# Patient Record
Sex: MALE | Race: WHITE | NOT HISPANIC OR LATINO | Employment: OTHER | URBAN - METROPOLITAN AREA
[De-identification: names, ages, dates, MRNs, and addresses within clinical notes are randomized per-mention and may not be internally consistent; named-entity substitution may affect disease eponyms.]

---

## 2017-01-30 ENCOUNTER — CLINICAL SUPPORT (OUTPATIENT)
Dept: SMOKING CESSATION | Facility: CLINIC | Age: 72
End: 2017-01-30
Payer: COMMERCIAL

## 2017-01-30 VITALS
TEMPERATURE: 98 F | WEIGHT: 134.69 LBS | HEIGHT: 64 IN | DIASTOLIC BLOOD PRESSURE: 63 MMHG | OXYGEN SATURATION: 89 % | BODY MASS INDEX: 22.99 KG/M2 | SYSTOLIC BLOOD PRESSURE: 128 MMHG | HEART RATE: 100 BPM

## 2017-01-30 DIAGNOSIS — F17.200 NICOTINE DEPENDENCE: Primary | ICD-10-CM

## 2017-01-30 PROCEDURE — 99404 PREV MED CNSL INDIV APPRX 60: CPT | Mod: S$GLB,,,

## 2017-01-30 PROCEDURE — 99999 PR PBB SHADOW E&M-EST. PATIENT-LVL III: CPT | Mod: PBBFAC,,,

## 2017-01-30 RX ORDER — METFORMIN HYDROCHLORIDE 500 MG/1
1000 TABLET ORAL 2 TIMES DAILY WITH MEALS
COMMUNITY

## 2017-01-30 NOTE — Clinical Note
Patient was seen in clinic today for Tobacco Cessation. Patient states that he smokes with stress and smoked 1 pack per day for 50 years. Patient state she is ready to quit for health reasons, he mentions his diagnosis of COPD and he uses oxygen as prescribed by his Pulmonologist, Dr. Neil Figueredo. Patient states that he has tried to quit in the past and he would like to try Chantix at this time. I have scheduled a follow up for 2/14/2017. Ángela Berger PA-C will review medical hx, family hx and medications. I have sent a signed request for medical record release, to Dr. Neil Figueredo.

## 2017-01-30 NOTE — Clinical Note
Please review chart for completion, and acknowledgement that you have reviewed medical history and medication records. You are in agreement with the tobacco cessation treatment plan as documented. I have pended the order for Chantix awaiting your review. Thank you.

## 2017-01-30 NOTE — MR AVS SNAPSHOT
[facility]  [address]  [place] [address]  Phone: [phone]                  Slick Holt   2017 6:00 PM   Clinical Support    Description:  Male : 1945   Provider:  Matthew Siegel   Department:  [facility]           Reason for Visit     Nicotine Dependence           Diagnoses this Visit        Comments    Nicotine dependence    -  Primary            To Do List           Future Appointments        Provider Department Dept Phone    2017 1:00 PM Matthew Siegel [facility] [phone]      Goals (5 Years of Data)     None      Follow-Up and Disposition     Discussed this visit Return in about 2 weeks (around 2017) for Tobacco Cessation .      [facility]     [facility] On Call Nurse Care Line -  Assistance  Registered nurses in the [facility] provide clinical advisement, health education, appointment booking, and other advisory services.  Call for this free service at [phone].             Medications           Message regarding Medications     Verify the changes and/or additions to your medication regime listed below are the same as discussed with your clinician today.  If any of these changes or additions are incorrect, please notify your healthcare provider.             Verify that the below list of medications is an accurate representation of the medications you are currently taking.  If none reported, the list may be blank. If incorrect, please contact your healthcare provider. Carry this list with you in case of emergency.           Current Medications     metformin (GLUCOPHAGE) 500 MG tablet Take 500 mg by mouth 2 (two) times daily with meals.    albuterol-ipratropium 2.5mg-0.5mg/3mL (DUO-NEB) 0.5 mg-3 mg(2.5 mg base)/3 mL nebulizer solution Take 3 mLs by nebulization 4 (four) times daily.    ammonium lactate (LAC-HYDRIN) 12 % lotion Apply topically 2 (two) times daily as needed for Dry Skin.     "aspirin (ECOTRIN) 81 MG EC tablet Take 81 mg by mouth once daily.    ezetimibe (ZETIA) 10 mg tablet Take 10 mg by mouth once daily.    fluorouracil (EFUDEX) 5 % cream Apply topically 2 (two) times daily.    INSULIN ASPART SUBQ Inject 15 Units into the skin 3 (three) times daily with meals.    insulin glargine (LANTUS) 100 unit/mL injection Inject 30 Units into the skin every evening.    ipratropium-albuterol (COMBIVENT)  mcg/actuation inhaler Inhale 1 puff into the lungs 4 (four) times daily as needed for Wheezing.    losartan (COZAAR) 25 MG tablet Take 25 mg by mouth once daily.    metoprolol tartrate (LOPRESSOR) 25 MG tablet Take 25 mg by mouth once daily.    nitroGLYCERIN (NITROSTAT) 0.4 MG SL tablet Place 0.4 mg under the tongue every 5 (five) minutes as needed for Chest pain.    rosuvastatin (CRESTOR) 40 MG Tab Take 20 mg by mouth every evening.    tiotropium (SPIRIVA) 18 mcg inhalation capsule Inhale 18 mcg into the lungs once daily.           Clinical Reference Information           Vital Signs - Last Recorded  Most recent update: 1/30/2017  6:11 PM by Matthew Siegel    BP Pulse Temp Ht Wt SpO2    128/63 (BP Location: Right arm, Patient Position: Sitting, BP Method: Automatic) 100 97.7 °F (36.5 °C) 5' 4" (1.626 m) 61.1 kg (134 lb 11.2 oz) (!) 89%    BMI                23.12 kg/m2          Blood Pressure          Most Recent Value    BP  128/63      Allergies as of 1/30/2017     Codeine      Immunizations Administered on Date of Encounter - 1/30/2017     None      [facility] Sign-Up     Activating your [facility] account is as easy as 1-2-3!     1) Visit [email or web address], select Sign Up Now, enter this activation code and your date of birth, then select Next.  [email or web address]  Expires: 3/16/2017  6:00 PM      2) Create a username and password to use when you visit [facility] in the future and select a security question in case you lose your password and select Next.    3) Enter your e-mail address and click " Sign Up!    Additional Information  If you have questions, please e-mail [email or web address] or call [phone] to talk to our [facility] staff. Remember, [facility] is NOT to be used for urgent needs. For medical emergencies, dial 911.         Smoking Cessation     If you would like to quit smoking:   You may be eligible for free services if you are a [place] resident and started smoking cigarettes before September 1, 1988.  Call the Smoking Cessation Trust (SCT) toll free at (018) 651-6290 or (010) 007-8888.   Call 2-338-QUIT-NOW if you do not meet the above criteria.

## 2017-01-31 RX ORDER — VARENICLINE TARTRATE 0.5 MG/1
TABLET, FILM COATED ORAL
Qty: 60 TABLET | Refills: 0 | Status: SHIPPED | OUTPATIENT
Start: 2017-01-31 | End: 2017-02-28

## 2017-02-01 NOTE — PROGRESS NOTES
Subjective:       Patient ID: Slick Holt is a 71 y.o. male.    Chief Complaint: Nicotine Dependence    HPI  Review of Systems    Objective:      Physical Exam    Assessment:       1. Nicotine dependence        Plan:       Slick was seen today for nicotine dependence.    Diagnoses and all orders for this visit:    Nicotine dependence  -     varenicline (CHANTIX) 0.5 MG Tab; Take one tablet per day 0.5 mg for 7 days, then take 0.5 mg twice per day.    Patient's medications and medical history reviewed via medical chart. . I agree treatment plan. Patient should contact clinic with any question or if any unwanted side effects develop.

## 2017-02-21 ENCOUNTER — CLINICAL SUPPORT (OUTPATIENT)
Dept: SMOKING CESSATION | Facility: CLINIC | Age: 72
End: 2017-02-21
Payer: COMMERCIAL

## 2017-02-21 DIAGNOSIS — F17.200 NICOTINE DEPENDENCE: Primary | ICD-10-CM

## 2017-02-21 PROCEDURE — 99407 BEHAV CHNG SMOKING > 10 MIN: CPT | Mod: S$GLB,,,

## 2017-02-26 ENCOUNTER — HOSPITAL ENCOUNTER (EMERGENCY)
Facility: HOSPITAL | Age: 72
Discharge: HOME | End: 2017-02-26
Attending: EMERGENCY MEDICINE
Payer: COMMERCIAL

## 2017-02-26 VITALS
DIASTOLIC BLOOD PRESSURE: 71 MMHG | SYSTOLIC BLOOD PRESSURE: 126 MMHG | BODY MASS INDEX: 22.2 KG/M2 | HEART RATE: 74 BPM | HEIGHT: 64 IN | TEMPERATURE: 98 F | OXYGEN SATURATION: 96 % | RESPIRATION RATE: 18 BRPM | WEIGHT: 130 LBS

## 2017-02-26 DIAGNOSIS — M79.89 LEG SWELLING: ICD-10-CM

## 2017-02-26 LAB
ALBUMIN SERPL BCP-MCNC: 3.8 G/DL
ALP SERPL-CCNC: 110 U/L
ALT SERPL W/O P-5'-P-CCNC: 14 U/L
ANION GAP SERPL CALC-SCNC: 10 MMOL/L
AST SERPL-CCNC: 13 U/L
BILIRUB SERPL-MCNC: 0.8 MG/DL
BUN SERPL-MCNC: 14 MG/DL
CALCIUM SERPL-MCNC: 9.8 MG/DL
CHLORIDE SERPL-SCNC: 99 MMOL/L
CO2 SERPL-SCNC: 27 MMOL/L
CREAT SERPL-MCNC: 0.9 MG/DL
EST. GFR  (AFRICAN AMERICAN): >60 ML/MIN/1.73 M^2
EST. GFR  (NON AFRICAN AMERICAN): >60 ML/MIN/1.73 M^2
GLUCOSE SERPL-MCNC: 315 MG/DL
POTASSIUM SERPL-SCNC: 4.1 MMOL/L
PROT SERPL-MCNC: 6.9 G/DL
SODIUM SERPL-SCNC: 136 MMOL/L

## 2017-02-26 PROCEDURE — 99284 EMERGENCY DEPT VISIT MOD MDM: CPT

## 2017-02-26 PROCEDURE — 80053 COMPREHEN METABOLIC PANEL: CPT

## 2017-02-26 PROCEDURE — 36415 COLL VENOUS BLD VENIPUNCTURE: CPT

## 2017-02-26 RX ORDER — CARVEDILOL 6.25 MG/1
6.25 TABLET ORAL 2 TIMES DAILY WITH MEALS
COMMUNITY

## 2017-02-26 RX ORDER — GUAIFENESIN 600 MG/1
1200 TABLET, EXTENDED RELEASE ORAL 2 TIMES DAILY
COMMUNITY

## 2017-02-26 RX ORDER — ERGOCALCIFEROL 1.25 MG/1
50000 CAPSULE ORAL
COMMUNITY

## 2017-02-26 RX ORDER — AMIODARONE HYDROCHLORIDE 100 MG/1
TABLET ORAL DAILY
COMMUNITY

## 2017-02-26 RX ORDER — FERROUS SULFATE, DRIED 160(50) MG
1 TABLET, EXTENDED RELEASE ORAL 2 TIMES DAILY WITH MEALS
COMMUNITY

## 2017-02-26 RX ORDER — LORATADINE 10 MG/1
10 TABLET ORAL DAILY
COMMUNITY

## 2017-02-26 NOTE — DISCHARGE INSTRUCTIONS
Leg Swelling in Both Legs    Swelling of the feet, ankles, and legs is called edema. It is caused by excess fluid that has collected in the tissues. Extra fluid in the body settles in the lowest part because of gravity. This is why the legs and feet are most affected.  Some of the causes for edema include:  · Disease of the heart like congestive heart failure  · Standing or sitting for long periods of time  · Infection of the feet or legs  · Blood pooling in the veins of your legs (venous insufficiency)  · Dilated veins in your lower leg (varicose veins)  · Garters or other clothing that is tight on your legs. This will cause blood to pool in your legs because the clothing limits blood flow.  · Some medicines such as hormones like birth control pills, some blood pressure medicines like calcium channel blockers (amlodipine) and steroids, some antidepressants like MAO inhibitors and tricyclics  · Menstrual periods that cause you to retain fluids  · Many types of renal disease  · Liver failure or cirrhosis  · Pregnancy, some swelling is normal, but a sudden increase in leg swelling or weight gain can be a sign of a dangerous complication of pregnancy  · Poor nutrition  · Thyroid disease  Medical treatment will depend on what is causing the swelling in your legs. Your healthcare provider may prescribe water pills (diuretics) to get rid of the extra fluid.  Home care  Follow these guidelines when caring for yourself at home:  · Don't wear clothing like garters that is tight on your legs.  · Keep your legs up while lying or sitting.  · If infection, injury, or recent surgery is causing the swelling, stay off your legs as much as possible until symptoms get better.  · If your healthcare provider says that your leg swelling is caused by venous insufficiency or varicose veins, don't sit or  one place for long periods of time. Take breaks and walk about every few hours. Brisk walking is a good exercise. It helps  circulate the blood that has collected in your leg. Talk with your provider about using support stockings to stop daytime leg swelling.  · If your provider says that heart disease is causing your leg swelling, follow a low-salt diet to stop extra fluid from staying in your body. You may also need medicine.  Follow-up care  Follow up with your healthcare provider, or as advised.  When to seek medical advice  Call your healthcare provider right away if any of these occur:  · New shortness of breath or chest pain  · Shortness of breath or chest pain that gets worse  · Swelling in both legs or ankles that gets worse  · Swelling of the abdomen  · Redness, warmth, or swelling in one leg  · Fever of 100.4ºF (38ºC) or higher, or as directed by your healthcare provider  · Yellow color to your skin or eyes  · Rapid, unexplained weight gain  · Having to sleep upright or use an increased number of pillows  Date Last Reviewed: 3/31/2016  © 7081-3033 [employer]. [address] [place] [address], [place] [address]. All rights reserved. This information is not intended as a substitute for professional medical care. Always follow your healthcare professional's instructions.

## 2017-02-26 NOTE — ED PROVIDER NOTES
Encounter Date: 2/26/2017    SCRIBE #1 NOTE: I, Bethany Jj, am scribing for, and in the presence of, Dr. Muse.       History     Chief Complaint   Patient presents with    Leg Swelling     Bilat lower leg edema x several years. today began with serous fluid draianage L lower leg.     Review of patient's allergies indicates:   Allergen Reactions    Codeine      HPI Comments: 02/26/2017  9:15 AM     Chief Complaint: Drainage from left lower leg      The patient is a 71 y.o. male who is presenting with a sudden onset of acute drainage from left lower leg that started approximately 7 hrs ago. Pt reported chronic bilateral leg swelling since quadruple CABG that increased 1.5 yrs ago and worsened more so over the past 1 mth. Pt also c/o chronic SOB. No CP. No calf pain. Pt reported a substantial cardiac hx including CHF, 14 cardiac stents, quadruple CABG, and he has a second pacemaker defibrillator. His cardiologist is Dr. Pradhan at [facility] and pt denied seeing his cardiologist in the past month. Pt has no past medical history on file. Pt has no past surgical history on file.      The history is provided by the patient.     History reviewed. No pertinent past medical history.  History reviewed. No pertinent surgical history.  History reviewed. No pertinent family history.  Social History   Substance Use Topics    Smoking status: Current Every Day Smoker     Packs/day: 0.50     Years: 48.00     Types: Cigarettes    Smokeless tobacco: None    Alcohol use Yes      Comment: rarely     Review of Systems   Respiratory: Positive for shortness of breath (Chronic SOB.).    Cardiovascular: Positive for leg swelling (Chronic bilateral leg swelling.). Negative for chest pain.   Gastrointestinal: Negative for nausea.   Musculoskeletal: Negative for back pain and myalgias (No calf pain.).   Skin: Negative for rash.        +Drainage from left lower leg.   Neurological: Negative for weakness.   Hematological: Does not  bruise/bleed easily.   Psychiatric/Behavioral: Negative for confusion.   All other systems reviewed and are negative.      Physical Exam   Initial Vitals   BP Pulse Resp Temp SpO2   02/26/17 0852 02/26/17 0852 02/26/17 0852 02/26/17 0852 02/26/17 0852   150/69 92 14 97.8 °F (36.6 °C) 94 %     Physical Exam    Nursing note and vitals reviewed.  Constitutional: He appears well-developed and well-nourished. He is not diaphoretic. No distress.   HENT:   Head: Normocephalic and atraumatic.   Eyes: EOM are normal.   Neck: Neck supple.   Cardiovascular: Normal rate, regular rhythm, normal heart sounds and intact distal pulses. Exam reveals no gallop and no friction rub.    No murmur heard.  Left upper chest pacemaker.   Pulmonary/Chest: Breath sounds normal. He has no wheezes. He has no rhonchi. He has no rales.   Musculoskeletal:   Bilateral lower extremity pitting edema. Left lateral side of lower leg is weeping.   Neurological: He is alert and oriented to person, place, and time.   Skin:   Multiple scattered SKs and AKs.   Psychiatric: He has a normal mood and affect.         ED Course   Procedures  Labs Reviewed - No data to display          Medical Decision Making:   Clinical Tests:   Lab Tests: Ordered and Reviewed            Scribe Attestation:   Scribe #1: I performed the above scribed service and the documentation accurately describes the services I performed. I attest to the accuracy of the note.    Attending Attestation:           Physician Attestation for Scribe:  Physician Attestation Statement for Scribe #1: I, Dr. Muse, reviewed documentation, as scribed by Bethany Jj in my presence, and it is both accurate and complete.                 ED Course     Clinical Impression:   The encounter diagnosis was Leg swelling.        71-year-old man with chronic lower extremity presents to the emergency room with weeping from the left lateral side of his left lower extremity.  He reports a worsening in his leg  swelling over the last month.  No chest pain no shortness of breath.  Well-appearing, ambulatory in the ED with no dyspnea on exertion.  Clinically no sign of heart failure.  Patient referred to wound care at [facility].  Patient reports that he has a close relationship with his cardiologist and should have no problem getting in touch with him this week.  I did recommend that he call his cardiologist after the Josel[place] Gras holiday.  Return to this emergency department for any worsening symptoms.     Louie Muse MD  02/26/17 6481

## 2017-02-26 NOTE — ED AVS SNAPSHOT
[facility] [address] [place] [address]               Slick Holt   2017  9:01 AM   ED    Description:  Male : 1945   Department:  [facility]           Your Care was Coordinated By:     Provider Role From To    Louie Muse MD Attending Provider 17 0903 --      Reason for Visit     Leg Swelling           Diagnoses this Visit        Comments    Leg swelling           ED Disposition     None           To Do List           Follow-up Information     Follow up with [facility].    Specialty:  Emergency Medicine    Why:  As needed, If symptoms worsen    Contact information:    [address] [place] [id number] [phone]        Follow up with Evan Pradhan MD. Call in 1 day.    Specialty:  Cardiovascular Disease    Contact information:    [address]  [address]  [facility] [id number] [phone]          Schedule an appointment as soon as possible for a visit with Sandy Martin MD.    Specialties:  Hospitalist, Internal Medicine    Contact information:    [address] [facility]  [facility] [address]  [phone]        [facility]     [facility] Care [facility] -  Assistance  Registered nurses in the [facility] provide clinical advisement, health education, appointment booking, and other advisory services.  Call for this free service at [phone].             Medications           Message regarding Medications     Verify the changes and/or additions to your medication regime listed below are the same as discussed with your clinician today.  If any of these changes or additions are incorrect, please notify your healthcare provider.             Verify that the below list of medications is an accurate representation of the medications you are currently taking.  If none reported, the list may be  blank. If incorrect, please contact your healthcare provider. Carry this list with you in case of emergency.           Current Medications     albuterol-ipratropium 2.5mg-0.5mg/3mL (DUO-NEB) 0.5 mg-3 mg(2.5 mg base)/3 mL nebulizer solution Take 3 mLs by nebulization 4 (four) times daily.    amiodarone (PACERONE) 100 MG Tab Take by mouth once daily.    ammonium lactate (LAC-HYDRIN) 12 % lotion Apply topically 2 (two) times daily as needed for Dry Skin.    aspirin (ECOTRIN) 81 MG EC tablet Take 81 mg by mouth once daily.    calcium-vitamin D3 (CALCIUM 500 + D) 500 mg(1,250mg) -200 unit per tablet Take 1 tablet by mouth 2 (two) times daily with meals.    carvedilol (COREG) 6.25 MG tablet Take 6.25 mg by mouth 2 (two) times daily with meals.    ergocalciferol (ERGOCALCIFEROL) 50,000 unit Cap Take 50,000 Units by mouth every 7 days.    ezetimibe (ZETIA) 10 mg tablet Take 10 mg by mouth once daily.    fluorouracil (EFUDEX) 5 % cream Apply topically 2 (two) times daily.    guaifenesin (MUCINEX) 600 mg 12 hr tablet Take 1,200 mg by mouth 2 (two) times daily.    INSULIN ASPART SUBQ Inject 10 Units into the skin 3 (three) times daily with meals.     insulin glargine (LANTUS) 100 unit/mL injection Inject 20 Units into the skin every evening.     ipratropium-albuterol (COMBIVENT)  mcg/actuation inhaler Inhale 1 puff into the lungs 4 (four) times daily as needed for Wheezing.    loratadine (CLARITIN) 10 mg tablet Take 10 mg by mouth once daily.    losartan (COZAAR) 25 MG tablet Take 25 mg by mouth once daily.    metformin (GLUCOPHAGE) 500 MG tablet Take 1,000 mg by mouth 2 (two) times daily with meals.     metoprolol tartrate (LOPRESSOR) 25 MG tablet Take 50 mg by mouth once daily.     nitroGLYCERIN (NITROSTAT) 0.4 MG SL tablet Place 0.4 mg under the tongue every 5 (five) minutes as needed for Chest pain.    rosuvastatin (CRESTOR) 40 MG Tab Take 20 mg by mouth every evening.    tiotropium (SPIRIVA) 18 mcg inhalation  capsule Inhale 18 mcg into the lungs once daily.    varenicline (CHANTIX) 0.5 MG Tab Take one tablet per day 0.5 mg for 7 days, then take 0.5 mg twice per day.           Clinical Reference Information           Your Vitals Were     BP                   150/69 (BP Location: Right arm, Patient Position: Sitting)           Allergies as of 2/26/2017        Reactions    Codeine       Immunizations Administered on Date of Encounter - 2/26/2017     None      ED Micro, Lab, POCT     Start Ordered       Status Ordering Provider    02/26/17 0923 02/26/17 0922  Comprehensive metabolic panel  STAT      Final result       ED Imaging Orders     Start Ordered       Status Ordering Provider    02/26/17 1031 02/26/17 1030  US Lower Extremity Veins Bilateral  1 time imaging      Final result         Discharge Instructions         Leg Swelling in Both Legs    Swelling of the feet, ankles, and legs is called edema. It is caused by excess fluid that has collected in the tissues. Extra fluid in the body settles in the lowest part because of gravity. This is why the legs and feet are most affected.  Some of the causes for edema include:  · Disease of the heart like congestive heart failure  · Standing or sitting for long periods of time  · Infection of the feet or legs  · Blood pooling in the veins of your legs (venous insufficiency)  · Dilated veins in your lower leg (varicose veins)  · Garters or other clothing that is tight on your legs. This will cause blood to pool in your legs because the clothing limits blood flow.  · Some medicines such as hormones like birth control pills, some blood pressure medicines like calcium channel blockers (amlodipine) and steroids, some antidepressants like MAO inhibitors and tricyclics  · Menstrual periods that cause you to retain fluids  · Many types of renal disease  · Liver failure or cirrhosis  · Pregnancy, some swelling is normal, but a sudden increase in leg swelling or weight gain can be a sign  of a dangerous complication of pregnancy  · Poor nutrition  · Thyroid disease  Medical treatment will depend on what is causing the swelling in your legs. Your healthcare provider may prescribe water pills (diuretics) to get rid of the extra fluid.  Home care  Follow these guidelines when caring for yourself at home:  · Don't wear clothing like garters that is tight on your legs.  · Keep your legs up while lying or sitting.  · If infection, injury, or recent surgery is causing the swelling, stay off your legs as much as possible until symptoms get better.  · If your healthcare provider says that your leg swelling is caused by venous insufficiency or varicose veins, don't sit or  one place for long periods of time. Take breaks and walk about every few hours. Brisk walking is a good exercise. It helps circulate the blood that has collected in your leg. Talk with your provider about using support stockings to stop daytime leg swelling.  · If your provider says that heart disease is causing your leg swelling, follow a low-salt diet to stop extra fluid from staying in your body. You may also need medicine.  Follow-up care  Follow up with your healthcare provider, or as advised.  When to seek medical advice  Call your healthcare provider right away if any of these occur:  · New shortness of breath or chest pain  · Shortness of breath or chest pain that gets worse  · Swelling in both legs or ankles that gets worse  · Swelling of the abdomen  · Redness, warmth, or swelling in one leg  · Fever of 100.4ºF (38ºC) or higher, or as directed by your healthcare provider  · Yellow color to your skin or eyes  · Rapid, unexplained weight gain  · Having to sleep upright or use an increased number of pillows  Date Last Reviewed: 3/31/2016  © 8229-3141 [employer]. [address] [place] [address], [place] [address]. All rights reserved. This information is not intended as a substitute for professional medical care. Always  follow your healthcare professional's instructions.          Smoking Cessation     If you would like to quit smoking:   You may be eligible for free services if you are a [place] resident and started smoking cigarettes before September 1, 1988.  Call the Smoking Cessation Trust (SCT) toll free at (455) 827-5250 or (704) 305-2984.   Call 1-800-QUIT-NOW if you do not meet the above criteria.             [facility] complies with applicable Federal civil rights laws and does not discriminate on the basis of race, color, national origin, age, disability, or sex.        Language Assistance Services     ATTENTION: Language assistance services are available, free of charge. Please call [phone].      ATENCIÓN: Si habla español, tiene a morel disposición servicios gratuitos de asistencia lingüística. Llame al [phone].     CHÚ Ý: N?u b?n nói Ti?ng Vi?t, có các d?ch v? h? tr? ngôn ng? mi?n phí dành cho b?n. G?i s? [phone].

## 2017-03-16 ENCOUNTER — CLINICAL SUPPORT (OUTPATIENT)
Dept: SMOKING CESSATION | Facility: CLINIC | Age: 72
End: 2017-03-16
Payer: COMMERCIAL

## 2017-03-16 DIAGNOSIS — F17.200 NICOTINE DEPENDENCE: Primary | ICD-10-CM

## 2017-03-16 PROCEDURE — 99403 PREV MED CNSL INDIV APPRX 45: CPT | Mod: S$GLB,,,

## 2017-03-16 PROCEDURE — 99999 PR PBB SHADOW E&M-EST. PATIENT-LVL I: CPT | Mod: PBBFAC,,,

## 2017-03-16 RX ORDER — VARENICLINE TARTRATE 1 MG/1
1 TABLET, FILM COATED ORAL DAILY
Qty: 30 TABLET | Refills: 0 | Status: SHIPPED | OUTPATIENT
Start: 2017-03-16 | End: 2017-03-21 | Stop reason: SDUPTHER

## 2017-03-16 NOTE — Clinical Note
Patient is smoking about 4 cigarettes per day. He states that he did not take Chantix while he was on vacation and that he just started it last week. He is tolerating Chantix well thus far without any negative side effects. We discussed and reviewed: orientation, client introductions, completion of TCRS (Tobacco Cessation Rating Scale) learned addiction model, cues/triggers, personal reasons for quitting, medications, goals, & quit date. He enjoys smoking outside on his back deck, so we discussed the move it strategy, as well as delay and distraction. He states that he loves fruit trees and planting, so he will stay busy in the yard. The patient will continue to come to the clinic for additional support and encouragement. The patient remains on the prescribed tobacco cessation medication regimen of 1 mg Chantix BID without any negative side effects at this time.

## 2017-03-16 NOTE — PROGRESS NOTES
Individual Follow-Up Form    3/16/2017    Quit Date:     Clinical Status of Patient: Outpatient    Length of Service: 45 minutes    Continuing Medication: yes  Chantix    Other Medications:      Target Symptoms: Withdrawal and medication side effects. The following were  rated moderate (3) to severe (4) on TCRS:  · Moderate (3): none  · Severe (4): none    Comments: Patient is smoking about 4 cigarettes per day. He states that he did not take Chantix while he was on vacation and that he just started it last week. He is tolerating Chantix well thus far without any negative side effects. We discussed and reviewed: orientation, client introductions, completion of TCRS (Tobacco Cessation Rating Scale) learned addiction model, cues/triggers, personal reasons for quitting, medications, goals, & quit date. He enjoys smoking outside on his back deck, so we discussed the move it strategy, as well as delay and distraction. He states that he loves fruit trees and planting, so he will stay busy in the yard. The patient will continue to come to the clinic for additional support and encouragement. The patient remains on the prescribed tobacco cessation medication regimen of 1 mg Chantix BID without any negative side effects at this time.     Diagnosis: F17.200    Next Visit: 2 weeks

## 2017-03-16 NOTE — MR AVS SNAPSHOT
[facility]  [address]  [place] [address]  Phone: [phone]                  Slick Holt   3/16/2017 2:00 PM   Clinical Support    Description:  Male : 1945   Provider:  Matthew Siegel   Department:  [place] [facility]           Reason for Visit     Nicotine Dependence                To Do List           Future Appointments        Provider Department Dept Phone    3/30/2017 2:00 PM Matthew Siegel [facility] [phone]      Goals (5 Years of Data)     None      Follow-Up and Disposition     Discussed this visit Return in about 2 weeks (around 3/30/2017) for Tobacco Cessation .      [facility]     [facility] On Call Nurse [facility] -  Assistance  Registered nurses in the [facility] On Call Center provide clinical advisement, health education, appointment booking, and other advisory services.  Call for this free service at [phone].             Medications           Message regarding Medications     Verify the changes and/or additions to your medication regime listed below are the same as discussed with your clinician today.  If any of these changes or additions are incorrect, please notify your healthcare provider.             Verify that the below list of medications is an accurate representation of the medications you are currently taking.  If none reported, the list may be blank. If incorrect, please contact your healthcare provider. Carry this list with you in case of emergency.           Current Medications     albuterol-ipratropium 2.5mg-0.5mg/3mL (DUO-NEB) 0.5 mg-3 mg(2.5 mg base)/3 mL nebulizer solution Take 3 mLs by nebulization 4 (four) times daily.    amiodarone (PACERONE) 100 MG Tab Take by mouth once daily.    ammonium lactate (LAC-HYDRIN) 12 % lotion Apply topically 2 (two) times daily as needed for Dry Skin.    aspirin (ECOTRIN) 81 MG EC tablet Take 81 mg by mouth once daily.    calcium-vitamin D3 (CALCIUM 500 + D) 500  mg(1,250mg) -200 unit per tablet Take 1 tablet by mouth 2 (two) times daily with meals.    carvedilol (COREG) 6.25 MG tablet Take 6.25 mg by mouth 2 (two) times daily with meals.    ergocalciferol (ERGOCALCIFEROL) 50,000 unit Cap Take 50,000 Units by mouth every 7 days.    ezetimibe (ZETIA) 10 mg tablet Take 10 mg by mouth once daily.    fluorouracil (EFUDEX) 5 % cream Apply topically 2 (two) times daily.    guaifenesin (MUCINEX) 600 mg 12 hr tablet Take 1,200 mg by mouth 2 (two) times daily.    INSULIN ASPART SUBQ Inject 10 Units into the skin 3 (three) times daily with meals.     insulin glargine (LANTUS) 100 unit/mL injection Inject 20 Units into the skin every evening.     ipratropium-albuterol (COMBIVENT)  mcg/actuation inhaler Inhale 1 puff into the lungs 4 (four) times daily as needed for Wheezing.    loratadine (CLARITIN) 10 mg tablet Take 10 mg by mouth once daily.    losartan (COZAAR) 25 MG tablet Take 25 mg by mouth once daily.    metformin (GLUCOPHAGE) 500 MG tablet Take 1,000 mg by mouth 2 (two) times daily with meals.     metoprolol tartrate (LOPRESSOR) 25 MG tablet Take 50 mg by mouth once daily.     nitroGLYCERIN (NITROSTAT) 0.4 MG SL tablet Place 0.4 mg under the tongue every 5 (five) minutes as needed for Chest pain.    rosuvastatin (CRESTOR) 40 MG Tab Take 20 mg by mouth every evening.    tiotropium (SPIRIVA) 18 mcg inhalation capsule Inhale 18 mcg into the lungs once daily.           Clinical Reference Information           Allergies as of 3/16/2017     Codeine      Immunizations Administered on Date of Encounter - 3/16/2017     None      Smoking Cessation     If you would like to quit smoking:   You may be eligible for free services if you are a [place] resident and started smoking cigarettes before September 1, 1988.  Call the Smoking Cessation Trust ([facility]) toll free at ([phone] 046-7538 or [phone] [phone].   Call -800-QUIT-NOW if you do not meet the above criteria.            Language  Assistance Services     ATTENTION: Language assistance services are available, free of charge. Please call [phone].      ATENCIÓN: Si habla jenna, tiene a morel disposición servicios gratuitos de asistencia lingüística. Llame al [phone].     CHÚ Ý: N?u b?n nói Ti?ng Vi?t, có các d?ch v? h? tr? ngôn ng? mi?n phí dành cho b?n. G?i s? [phone].         [place] [facility] complies with applicable Federal civil rights laws and does not discriminate on the basis of race, color, national origin, age, disability, or sex.

## 2017-03-21 RX ORDER — VARENICLINE TARTRATE 1 MG/1
1 TABLET, FILM COATED ORAL 2 TIMES DAILY
Qty: 60 TABLET | Refills: 0 | Status: SHIPPED | OUTPATIENT
Start: 2017-03-21 | End: 2017-04-11 | Stop reason: SDUPTHER

## 2017-03-21 NOTE — PROGRESS NOTES
I called the pharmacy to be sure the prescription change for Chantix was corrected to the standard dose of 1 mg BID, I spoke to Klever and he confirmed it was received.

## 2017-03-30 ENCOUNTER — CLINICAL SUPPORT (OUTPATIENT)
Dept: SMOKING CESSATION | Facility: CLINIC | Age: 72
End: 2017-03-30
Payer: COMMERCIAL

## 2017-03-30 DIAGNOSIS — F17.200 NICOTINE DEPENDENCE: Primary | ICD-10-CM

## 2017-03-30 PROCEDURE — 99407 BEHAV CHNG SMOKING > 10 MIN: CPT | Mod: S$GLB,,,

## 2017-04-11 ENCOUNTER — CLINICAL SUPPORT (OUTPATIENT)
Dept: SMOKING CESSATION | Facility: CLINIC | Age: 72
End: 2017-04-11
Payer: COMMERCIAL

## 2017-04-11 DIAGNOSIS — F17.200 NICOTINE DEPENDENCE: Primary | ICD-10-CM

## 2017-04-11 PROCEDURE — 99403 PREV MED CNSL INDIV APPRX 45: CPT | Mod: S$GLB,,,

## 2017-04-11 PROCEDURE — 99999 PR PBB SHADOW E&M-EST. PATIENT-LVL I: CPT | Mod: PBBFAC,,,

## 2017-04-11 RX ORDER — VARENICLINE TARTRATE 1 MG/1
1 TABLET, FILM COATED ORAL 2 TIMES DAILY
Qty: 60 TABLET | Refills: 0 | Status: SHIPPED | OUTPATIENT
Start: 2017-04-11 | End: 2017-04-11 | Stop reason: SDUPTHER

## 2017-04-11 RX ORDER — VARENICLINE TARTRATE 1 MG/1
1 TABLET, FILM COATED ORAL 2 TIMES DAILY
Qty: 60 TABLET | Refills: 0 | Status: SHIPPED | OUTPATIENT
Start: 2017-04-11 | End: 2017-05-11

## 2017-04-11 NOTE — PROGRESS NOTES
Individual Follow-Up Form    4/11/2017    Quit Date:   Clinical Status of Patient: Outpatient    Length of Service: 45 minutes    Continuing Medication: yes  Chantix    Other Medications:      Target Symptoms: Withdrawal and medication side effects. The following were  rated moderate (3) to severe (4) on TCRS:  · Moderate (3): none  · Severe (4): none    Comments: Patient is smoking 4 cigarettes per day. He has set a quit date for 5/19/2017. He is using strategies we have previously discussed. His wife is smoking 2 packs of cigarettes per day and this is challenging for him. We discussed and reviewed: completion of TCRS (Tobacco Cessation Rating Scale) reviewed strategies, controlling environment, cues, triggers, new goals set. Introduced high risk situations with preparation interventions, caffeine similarities with withdrawal issues of habit and nicotine, Alcohol, Understanding urges, cravings, stress and relaxation. Open discussion with intervention discussion. The patient will continue to come to the clinic for additional support and encouragement. We dicussed his reasons for wanting to quit and he wants his cough and shortness of breath to improve.     Diagnosis: F17.200    Next Visit: 2 weeks

## 2017-04-11 NOTE — MR AVS SNAPSHOT
[facility]  [address]  [place] [address]  Phone: [phone]                  Slick Holt   2017 2:00 PM   Clinical Support    Description:  Male : 1945   Provider:  Matthew Siegel   Department:  [place] [facility]           Reason for Visit     Nicotine Dependence           Diagnoses this Visit        Comments    Nicotine dependence    -  Primary            To Do List           Future Appointments        Provider Department Dept Phone    2017 2:00 PM Mtathew Siegel [facility] [facility] [phone]      Goals (5 Years of Data)     None      Follow-Up and Disposition     Discussed this visit Return in about 2 weeks (around 2017) for Tobacco Cessation .       These Medications        Disp Refills Start End    varenicline (CHANTIX) 1 mg Tab 60 tablet 0 2017    Take 1 tablet (1 mg total) by mouth 2 (two) times daily. - Oral    Pharmacy: [facility] [address] #: [phone]         [facility] On Call     [facility] On Call Nurse Care Line -  Assistance  Unless otherwise directed by your provider, please contact [facility], our nurse care line that is available for  assistance.     Registered nurses in the [facility] On Call Center provide: appointment scheduling, clinical advisement, health education, and other advisory services.  Call: [phone] (toll free)               Medications           Message regarding Medications     Verify the changes and/or additions to your medication regime listed below are the same as discussed with your clinician today.  If any of these changes or additions are incorrect, please notify your healthcare provider.             Verify that the below list of medications is an accurate representation of the medications you are currently taking.  If none reported, the list may be blank. If incorrect, please contact your healthcare provider. Carry this list with you  in case of emergency.           Current Medications     varenicline (CHANTIX) 1 mg Tab Take 1 tablet (1 mg total) by mouth 2 (two) times daily.    albuterol-ipratropium 2.5mg-0.5mg/3mL (DUO-NEB) 0.5 mg-3 mg(2.5 mg base)/3 mL nebulizer solution Take 3 mLs by nebulization 4 (four) times daily.    amiodarone (PACERONE) 100 MG Tab Take by mouth once daily.    ammonium lactate (LAC-HYDRIN) 12 % lotion Apply topically 2 (two) times daily as needed for Dry Skin.    aspirin (ECOTRIN) 81 MG EC tablet Take 81 mg by mouth once daily.    calcium-vitamin D3 (CALCIUM 500 + D) 500 mg(1,250mg) -200 unit per tablet Take 1 tablet by mouth 2 (two) times daily with meals.    carvedilol (COREG) 6.25 MG tablet Take 6.25 mg by mouth 2 (two) times daily with meals.    ergocalciferol (ERGOCALCIFEROL) 50,000 unit Cap Take 50,000 Units by mouth every 7 days.    ezetimibe (ZETIA) 10 mg tablet Take 10 mg by mouth once daily.    fluorouracil (EFUDEX) 5 % cream Apply topically 2 (two) times daily.    guaifenesin (MUCINEX) 600 mg 12 hr tablet Take 1,200 mg by mouth 2 (two) times daily.    INSULIN ASPART SUBQ Inject 10 Units into the skin 3 (three) times daily with meals.     insulin glargine (LANTUS) 100 unit/mL injection Inject 20 Units into the skin every evening.     ipratropium-albuterol (COMBIVENT)  mcg/actuation inhaler Inhale 1 puff into the lungs 4 (four) times daily as needed for Wheezing.    loratadine (CLARITIN) 10 mg tablet Take 10 mg by mouth once daily.    losartan (COZAAR) 25 MG tablet Take 25 mg by mouth once daily.    metformin (GLUCOPHAGE) 500 MG tablet Take 1,000 mg by mouth 2 (two) times daily with meals.     metoprolol tartrate (LOPRESSOR) 25 MG tablet Take 50 mg by mouth once daily.     nitroGLYCERIN (NITROSTAT) 0.4 MG SL tablet Place 0.4 mg under the tongue every 5 (five) minutes as needed for Chest pain.    rosuvastatin (CRESTOR) 40 MG Tab Take 20 mg by mouth every evening.    tiotropium (SPIRIVA) 18 mcg inhalation  capsule Inhale 18 mcg into the lungs once daily.           Clinical Reference Information           Allergies as of 4/11/2017     Codeine      Immunizations Administered on Date of Encounter - 4/11/2017     None      Smoking Cessation     If you would like to quit smoking:   You may be eligible for free services if you are a [place] resident and started smoking cigarettes before September 1, 1988.  Call the Smoking Cessation Trust ([facility]) toll free at [phone] or [phone] [phone].   Call 1-800-QUIT-NOW if you do not meet the above criteria.   Contact us via email: [email or web address]   View our website for more information: [email or web address]        Language Assistance Services     ATTENTION: Language assistance services are available, free of charge. Please call [phone].      ATENCIÓN: Si habla español, tiene a morel disposición servicios gratuitos de asistencia lingüística. Llame al [phone].     CHÚ Ý: N?u b?n nói Ti?ng Vi?t, có các d?ch v? h? tr? ngôn ng? mi?n phí dành cho b?n. G?i s? [phone].         [facility] complies with applicable Federal civil rights laws and does not discriminate on the basis of race, color, national origin, age, disability, or sex.

## 2017-04-11 NOTE — Clinical Note
Patient is smoking 4 cigarettes per day. He has set a quit date for 5/19/2017. He is using strategies we have previously discussed. His wife is smoking 2 packs of cigarettes per day and this is challenging for him. We discussed and reviewed: completion of TCRS (Tobacco Cessation Rating Scale) reviewed strategies, controlling environment, cues, triggers, new goals set. Introduced high risk situations with preparation interventions, caffeine similarities with withdrawal issues of habit and nicotine, Alcohol, Understanding urges, cravings, stress and relaxation. Open discussion with intervention discussion. The patient will continue to come to the clinic for additional support and encouragement. We dicussed his reasons for wanting to quit and he wants his cough and shortness of breath to improve.

## 2017-05-01 ENCOUNTER — TELEPHONE (OUTPATIENT)
Dept: SMOKING CESSATION | Facility: CLINIC | Age: 72
End: 2017-05-01

## 2017-05-01 NOTE — TELEPHONE ENCOUNTER
I called patient today for tobacco cessation follow up, but I had to leave message. I did leave my name and contact number ([phone]) for a return call.

## 2017-07-19 ENCOUNTER — CLINICAL SUPPORT (OUTPATIENT)
Dept: SMOKING CESSATION | Facility: CLINIC | Age: 72
End: 2017-07-19
Payer: COMMERCIAL

## 2017-07-19 DIAGNOSIS — F17.200 NICOTINE DEPENDENCE: Primary | ICD-10-CM

## 2017-07-19 PROCEDURE — 99407 BEHAV CHNG SMOKING > 10 MIN: CPT | Mod: S$GLB,,,

## 2018-01-24 ENCOUNTER — CLINICAL SUPPORT (OUTPATIENT)
Dept: SMOKING CESSATION | Facility: CLINIC | Age: 73
End: 2018-01-24
Payer: COMMERCIAL

## 2018-01-24 DIAGNOSIS — F17.200 NICOTINE DEPENDENCE: Primary | ICD-10-CM

## 2018-01-24 PROCEDURE — 99406 BEHAV CHNG SMOKING 3-10 MIN: CPT | Mod: S$GLB,,,
